# Patient Record
Sex: MALE | Race: WHITE | NOT HISPANIC OR LATINO | Employment: STUDENT | URBAN - METROPOLITAN AREA
[De-identification: names, ages, dates, MRNs, and addresses within clinical notes are randomized per-mention and may not be internally consistent; named-entity substitution may affect disease eponyms.]

---

## 2021-08-16 ENCOUNTER — LAB (OUTPATIENT)
Dept: LAB | Facility: HOSPITAL | Age: 21
End: 2021-08-16

## 2021-08-16 ENCOUNTER — OFFICE VISIT (OUTPATIENT)
Dept: INTERNAL MEDICINE | Facility: CLINIC | Age: 21
End: 2021-08-16

## 2021-08-16 VITALS
WEIGHT: 137 LBS | HEART RATE: 92 BPM | DIASTOLIC BLOOD PRESSURE: 78 MMHG | HEIGHT: 70 IN | BODY MASS INDEX: 19.61 KG/M2 | TEMPERATURE: 97.8 F | SYSTOLIC BLOOD PRESSURE: 102 MMHG

## 2021-08-16 DIAGNOSIS — Z76.89 ENCOUNTER TO ESTABLISH CARE: ICD-10-CM

## 2021-08-16 DIAGNOSIS — R11.2 NAUSEA AND VOMITING, INTRACTABILITY OF VOMITING NOT SPECIFIED, UNSPECIFIED VOMITING TYPE: Primary | ICD-10-CM

## 2021-08-16 DIAGNOSIS — F12.10 CANNABIS ABUSE, DAILY USE: ICD-10-CM

## 2021-08-16 DIAGNOSIS — R11.2 NAUSEA AND VOMITING, INTRACTABILITY OF VOMITING NOT SPECIFIED, UNSPECIFIED VOMITING TYPE: ICD-10-CM

## 2021-08-16 LAB
ALBUMIN SERPL-MCNC: 5.2 G/DL (ref 3.5–5.2)
ALBUMIN/GLOB SERPL: 1.5 G/DL
ALP SERPL-CCNC: 76 U/L (ref 39–117)
ALT SERPL W P-5'-P-CCNC: 37 U/L (ref 1–41)
AMYLASE SERPL-CCNC: 69 U/L (ref 28–100)
ANION GAP SERPL CALCULATED.3IONS-SCNC: 11.8 MMOL/L (ref 5–15)
AST SERPL-CCNC: 30 U/L (ref 1–40)
BASOPHILS # BLD AUTO: 0.03 10*3/MM3 (ref 0–0.2)
BASOPHILS NFR BLD AUTO: 0.2 % (ref 0–1.5)
BILIRUB SERPL-MCNC: 1.8 MG/DL (ref 0–1.2)
BUN SERPL-MCNC: 30 MG/DL (ref 6–20)
BUN/CREAT SERPL: 20.1 (ref 7–25)
CALCIUM SPEC-SCNC: 10.3 MG/DL (ref 8.6–10.5)
CHLORIDE SERPL-SCNC: 80 MMOL/L (ref 98–107)
CHOLEST SERPL-MCNC: 120 MG/DL (ref 0–200)
CO2 SERPL-SCNC: 37.2 MMOL/L (ref 22–29)
CREAT SERPL-MCNC: 1.49 MG/DL (ref 0.76–1.27)
DEPRECATED RDW RBC AUTO: 36.3 FL (ref 37–54)
EOSINOPHIL # BLD AUTO: 0.11 10*3/MM3 (ref 0–0.4)
EOSINOPHIL NFR BLD AUTO: 0.9 % (ref 0.3–6.2)
ERYTHROCYTE [DISTWIDTH] IN BLOOD BY AUTOMATED COUNT: 11.7 % (ref 12.3–15.4)
GFR SERPL CREATININE-BSD FRML MDRD: 59 ML/MIN/1.73
GLOBULIN UR ELPH-MCNC: 3.4 GM/DL
GLUCOSE SERPL-MCNC: 104 MG/DL (ref 65–99)
HCT VFR BLD AUTO: 49.8 % (ref 37.5–51)
HDLC SERPL-MCNC: 52 MG/DL (ref 40–60)
HGB BLD-MCNC: 17.6 G/DL (ref 13–17.7)
IMM GRANULOCYTES # BLD AUTO: 0.06 10*3/MM3 (ref 0–0.05)
IMM GRANULOCYTES NFR BLD AUTO: 0.5 % (ref 0–0.5)
LDLC SERPL CALC-MCNC: 55 MG/DL (ref 0–100)
LDLC/HDLC SERPL: 1.07 {RATIO}
LIPASE SERPL-CCNC: 37 U/L (ref 13–60)
LYMPHOCYTES # BLD AUTO: 2.45 10*3/MM3 (ref 0.7–3.1)
LYMPHOCYTES NFR BLD AUTO: 20.3 % (ref 19.6–45.3)
MCH RBC QN AUTO: 30.8 PG (ref 26.6–33)
MCHC RBC AUTO-ENTMCNC: 35.3 G/DL (ref 31.5–35.7)
MCV RBC AUTO: 87.2 FL (ref 79–97)
MONOCYTES # BLD AUTO: 1.43 10*3/MM3 (ref 0.1–0.9)
MONOCYTES NFR BLD AUTO: 11.9 % (ref 5–12)
NEUTROPHILS NFR BLD AUTO: 66.2 % (ref 42.7–76)
NEUTROPHILS NFR BLD AUTO: 7.98 10*3/MM3 (ref 1.7–7)
NRBC BLD AUTO-RTO: 0 /100 WBC (ref 0–0.2)
PLATELET # BLD AUTO: 477 10*3/MM3 (ref 140–450)
PMV BLD AUTO: 10.2 FL (ref 6–12)
POTASSIUM SERPL-SCNC: 3.4 MMOL/L (ref 3.5–5.2)
PROT SERPL-MCNC: 8.6 G/DL (ref 6–8.5)
RBC # BLD AUTO: 5.71 10*6/MM3 (ref 4.14–5.8)
SODIUM SERPL-SCNC: 129 MMOL/L (ref 136–145)
TRIGL SERPL-MCNC: 62 MG/DL (ref 0–150)
TSH SERPL DL<=0.05 MIU/L-ACNC: 0.99 UIU/ML (ref 0.27–4.2)
VLDLC SERPL-MCNC: 13 MG/DL (ref 5–40)
WBC # BLD AUTO: 12.06 10*3/MM3 (ref 3.4–10.8)

## 2021-08-16 PROCEDURE — 81001 URINALYSIS AUTO W/SCOPE: CPT

## 2021-08-16 PROCEDURE — 80053 COMPREHEN METABOLIC PANEL: CPT

## 2021-08-16 PROCEDURE — 99204 OFFICE O/P NEW MOD 45 MIN: CPT | Performed by: NURSE PRACTITIONER

## 2021-08-16 PROCEDURE — 36415 COLL VENOUS BLD VENIPUNCTURE: CPT

## 2021-08-16 PROCEDURE — 80061 LIPID PANEL: CPT

## 2021-08-16 PROCEDURE — 85025 COMPLETE CBC W/AUTO DIFF WBC: CPT

## 2021-08-16 PROCEDURE — 83690 ASSAY OF LIPASE: CPT

## 2021-08-16 PROCEDURE — 82150 ASSAY OF AMYLASE: CPT

## 2021-08-16 PROCEDURE — 84443 ASSAY THYROID STIM HORMONE: CPT

## 2021-08-16 RX ORDER — ONDANSETRON 4 MG/1
4 TABLET, FILM COATED ORAL EVERY 8 HOURS PRN
Qty: 30 TABLET | Refills: 0 | Status: SHIPPED | OUTPATIENT
Start: 2021-08-16

## 2021-08-16 RX ORDER — METOCLOPRAMIDE 10 MG/1
10 TABLET ORAL
COMMUNITY
End: 2021-08-16

## 2021-08-16 RX ORDER — OMEPRAZOLE 40 MG/1
40 CAPSULE, DELAYED RELEASE ORAL DAILY
Qty: 30 CAPSULE | Refills: 0 | Status: SHIPPED | OUTPATIENT
Start: 2021-08-16

## 2021-08-16 NOTE — PATIENT INSTRUCTIONS
Cannabis Use Disorder  Cannabis use disorder occurs when marijuana use disrupts a person's daily life or causes health problems. This condition can be dangerous. The health problems this condition can cause include:  · Long-lasting problems with thinking and learning. These can be permanent in young people.  · Mental health problems, such as severe anxiety, paranoia, hallucinations, or schizophrenia.  · Dangerously high blood pressure and heart rate.  · Breathing problems.  · Problems with child development during and after pregnancy.  People with this condition are also more likely to use other drugs.  What are the causes?  This condition is caused by using marijuana too much over time. It is not caused by using it only once in a while. Many people with this condition use marijuana because it gives them a feeling of extreme pleasure or relaxation.  What increases the risk?  This condition is more likely to develop in:  · Men.  · People with a family history of cannabis use disorder.  · People with mental health issues such as depression or post-traumatic stress disorder (PTSD).  What are the signs or symptoms?  Symptoms of this condition include:  Addiction  · Using greater amounts of marijuana than you want to, or using marijuana for longer than you want to.  · Craving marijuana.  · Spending a lot of time getting marijuana, using it, or recovering from its effects.  · Having problems at work, at school, at home, or in relationships because of marijuana use.  · Giving up or cutting down on important life activities because of marijuana use.  · Using marijuana at times when it is dangerous, such as while you are driving a car.  · Needing more and more marijuana to get the same desired effect (building up a tolerance).  · Lack of motivation, known as amotivational syndrome, which leads to poor school and work performance.  Physical problems  · A long-lasting cough.  · Bronchitis.  · Emphysema.  · Throat and lung  cancer.  Mental problems  · Psychosis.  · Anxiety.  · Trouble sleeping.  · Increase in violent behavior in young people.  Withdrawal problems  You may have symptoms when you stop using marijuana. Symptoms include:  · Irritability or anger.  · Anxiety or restlessness.  · Trouble sleeping.  · Loss of appetite or weight loss.  · Aches and pains.  · Shakiness, sweating, or chills.  How is this diagnosed?  This condition is diagnosed with an assessment. During the assessment, your health care provider will ask about your marijuana use and how it affects your life. You will be diagnosed with the condition if you have had at least two symptoms of this condition within a 12-month period. How severe the condition is depends on how many symptoms you have.  · If you have two to three symptoms, your condition is mild.  · If you have four to five symptoms, your condition is moderate.  · If you have six or more symptoms, your condition is severe.  Your health care provider may perform a physical exam or do lab tests to see if you have physical problems resulting from marijuana use. Your health care provider may also screen for drug use and refer you to a mental health professional for evaluation.  How is this treated?  Treatment for this condition is usually provided by mental health professionals with training in substance use disorders. Your treatment may involve:  · Counseling. This treatment is also called talk therapy. It is provided by substance use treatment counselors. A counselor can address the reasons you use marijuana and suggest ways to keep you from using it again. The goals of talk therapy are to:  ? Find healthy activities to replace using marijuana.  ? Identify and avoid the things that trigger your marijuana use.  ? Help you learn how to handle cravings.  · Support groups. Support groups are led by people who have quit using marijuana. They provide emotional support, advice, and guidance.  · Medicine. Medicine  is used to treat mental health issues that trigger marijuana use or that result from it.  Follow these instructions at home:  Lifestyle  Make healthy lifestyle choices, such as:  · Eating a healthy diet.  · Getting enough exercise.  · Improving your stress-management skills.    General instructions  · Take over-the-counter, prescription medicines, and herbal remedies only as told by your health care provider.  · Check with your health care provider before starting any new medicines.  · Work with your counselor or group to develop tools to keep you from using marijuana again (relapsing).  · Learn daily living skills and work skills.  · Keep all follow-up visits as told by your health care provider. This is important.  Where to find more information  · National [place] on Drug Abuse: www.drugabuse.gov  · Centers for Disease Control and Prevention: www.cdc.gov  · Substance Abuse and Mental Health Services Administration: www.samhsa.gov  Contact a health care provider if:  · You are not able to take your medicines as told.  · Your symptoms get worse.  Get help right away if:  · You have serious thoughts about hurting yourself or others.  If you ever feel like you may hurt yourself or others, or have thoughts about taking your own life, get help right away. You can go to your nearest emergency department or call:  · Your local emergency services (911 in the [place]  · A suicide crisis helpline, such as the National Suicide Prevention Lifeline at 1-478.661.7620. This is open 24 hours a day in the [place]  Summary  · Cannabis use disorder is when using marijuana disrupts a person's daily life or causes health problems.  · This condition is caused by using marijuana too much over time.  · Treatment for this condition is usually provided by mental health professionals with training in substance use disorders.  · Treatment may involve counseling, support groups, or medicine.  This information is not intended to replace advice  given to you by your health care provider. Make sure you discuss any questions you have with your health care provider.  Document Revised: 11/17/2020 Document Reviewed: 11/17/2020  [employer] Patient Education © 2021 [employer]

## 2021-08-16 NOTE — PROGRESS NOTES
Roly Pearl  2000  [id number]  Patient Care Team:  Marie Spangler APRN as PCP - General (Internal Medicine)    Roly Pearl is a 21 y.o. here today to establish care.    Chief Complaint   Patient presents with   • GI Problem     Patient is here to establish care       HPI:   Roly suspects he has developed cannaboid hyperemesis syndrome.  1 week history of abd pain n/v/diarrhea and shaking.  Initially, he reports vomiting 50 less times a day.  For the past 2 to 3 days is vomiting 2-3 times a day.  No blood in stool. Was seen in ED at [facility]. Initially had periumbilical pain.  Only had IVF but no meds, labs or imaging.  Given rx for relgan from ED and only took 1-2 doses. No diarreha past five days. Smoking thc x 2 yrs, increased to daily over past 2 mo.  Prior smoked 3-4 x weekly.  No THC this week.  Normally has a good appetite but has lost 15 pounds during this episode.  Typically weight 150-160.  He is rehydrating slowly and trying to eat small amounts of bland foods.  Ate a few cheese its last night and a banana today but had vomiting after both.    2 other episodes in the past year but neither were this severe.  Hot bath/shower helped in the past.    No IBD in family    He plans to go to college in [place] and leave later this week.    History reviewed. No pertinent past medical history.  History reviewed. No pertinent surgical history.  Family History   Problem Relation Age of Onset   • Arthritis Mother      Social History     Tobacco Use   Smoking Status Never Smoker   Smokeless Tobacco Never Used     No Known Allergies    Current Outpatient Medications:   •  omeprazole (priLOSEC) 40 MG capsule, Take 1 capsule by mouth Daily., Disp: 30 capsule, Rfl: 0  •  ondansetron (Zofran) 4 MG tablet, Take 1 tablet by mouth Every 8 (Eight) Hours As Needed for Nausea or Vomiting., Disp: 30 tablet, Rfl: 0    Review of Systems    /78 (BP Location: Left arm, Patient Position:  "Sitting, Cuff Size: Adult)   Pulse 92   Temp 97.8 °F (36.6 °C) (Temporal)   Ht 179 cm (70.47\")   Wt 62.1 kg (137 lb)   BMI 19.40 kg/m²     Physical Exam  Vitals reviewed.   Constitutional:       General: He is not in acute distress.     Appearance: Normal appearance. He is well-developed. He is not ill-appearing or toxic-appearing.   HENT:      Head: Normocephalic.      Comments: *wearing mask     Right Ear: External ear normal.      Left Ear: External ear normal.   Eyes:      Conjunctiva/sclera: Conjunctivae normal.      Pupils: Pupils are equal, round, and reactive to light.   Cardiovascular:      Rate and Rhythm: Normal rate and regular rhythm.      Pulses: Normal pulses.      Heart sounds: Normal heart sounds.   Pulmonary:      Effort: Pulmonary effort is normal.      Breath sounds: Normal breath sounds.   Abdominal:      General: Abdomen is flat. Bowel sounds are normal.      Palpations: Abdomen is soft.      Tenderness: There is no abdominal tenderness.   Musculoskeletal:         General: Normal range of motion.      Cervical back: Normal range of motion and neck supple.   Skin:     General: Skin is warm and dry.   Neurological:      Mental Status: He is alert and oriented to person, place, and time.   Psychiatric:         Mood and Affect: Mood normal.         Behavior: Behavior normal.         Thought Content: Thought content normal.         Judgment: Judgment normal.         Results Review:  None    Assessment/Plan:  Patient Instructions   Problem List Items Addressed This Visit     None      Visit Diagnoses     Nausea and vomiting, intractability of vomiting not specified, unspecified vomiting type    -  Primary    Add omeprazole 40 mg daily first thing every morning, use ondansetron as needed, labs and urine today.  will need 1 mo f/u here or [facility] in [place]    Relevant Orders    CBC & Differential    Comprehensive Metabolic Panel    Lipid Panel    TSH Rfx On Abnormal To Free T4    Urinalysis With " Culture If Indicated - Urine, Clean Catch    Lipase    Amylase    Cannabis abuse, daily use        advise continued cessation from cannabis    Encounter to establish care                 Diagnosis Plan   1. Nausea and vomiting, intractability of vomiting not specified, unspecified vomiting type  CBC & Differential    Comprehensive Metabolic Panel    Lipid Panel    TSH Rfx On Abnormal To Free T4    Urinalysis With Culture If Indicated - Urine, Clean Catch    Lipase    Amylase    Add omeprazole 40 mg daily first thing every morning, use ondansetron as needed, labs and urine today.  will need 1 mo f/u here or [facility] in [place]   2. Cannabis abuse, daily use      advise continued cessation from cannabis   3. Encounter to establish care         Patient Instructions   Cannabis Use Disorder  Cannabis use disorder occurs when marijuana use disrupts a person's daily life or causes health problems. This condition can be dangerous. The health problems this condition can cause include:  · Long-lasting problems with thinking and learning. These can be permanent in young people.  · Mental health problems, such as severe anxiety, paranoia, hallucinations, or schizophrenia.  · Dangerously high blood pressure and heart rate.  · Breathing problems.  · Problems with child development during and after pregnancy.  People with this condition are also more likely to use other drugs.  What are the causes?  This condition is caused by using marijuana too much over time. It is not caused by using it only once in a while. Many people with this condition use marijuana because it gives them a feeling of extreme pleasure or relaxation.  What increases the risk?  This condition is more likely to develop in:  · Men.  · People with a family history of cannabis use disorder.  · People with mental health issues such as depression or post-traumatic stress disorder (PTSD).  What are the signs or symptoms?  Symptoms of this condition  include:  Addiction  · Using greater amounts of marijuana than you want to, or using marijuana for longer than you want to.  · Craving marijuana.  · Spending a lot of time getting marijuana, using it, or recovering from its effects.  · Having problems at work, at school, at home, or in relationships because of marijuana use.  · Giving up or cutting down on important life activities because of marijuana use.  · Using marijuana at times when it is dangerous, such as while you are driving a car.  · Needing more and more marijuana to get the same desired effect (building up a tolerance).  · Lack of motivation, known as amotivational syndrome, which leads to poor school and work performance.  Physical problems  · A long-lasting cough.  · Bronchitis.  · Emphysema.  · Throat and lung cancer.  Mental problems  · Psychosis.  · Anxiety.  · Trouble sleeping.  · Increase in violent behavior in young people.  Withdrawal problems  You may have symptoms when you stop using marijuana. Symptoms include:  · Irritability or anger.  · Anxiety or restlessness.  · Trouble sleeping.  · Loss of appetite or weight loss.  · Aches and pains.  · Shakiness, sweating, or chills.  How is this diagnosed?  This condition is diagnosed with an assessment. During the assessment, your health care provider will ask about your marijuana use and how it affects your life. You will be diagnosed with the condition if you have had at least two symptoms of this condition within a 12-month period. How severe the condition is depends on how many symptoms you have.  · If you have two to three symptoms, your condition is mild.  · If you have four to five symptoms, your condition is moderate.  · If you have six or more symptoms, your condition is severe.  Your health care provider may perform a physical exam or do lab tests to see if you have physical problems resulting from marijuana use. Your health care provider may also screen for drug use and refer you to a  mental health professional for evaluation.  How is this treated?  Treatment for this condition is usually provided by mental health professionals with training in substance use disorders. Your treatment may involve:  · Counseling. This treatment is also called talk therapy. It is provided by substance use treatment counselors. A counselor can address the reasons you use marijuana and suggest ways to keep you from using it again. The goals of talk therapy are to:  ? Find healthy activities to replace using marijuana.  ? Identify and avoid the things that trigger your marijuana use.  ? Help you learn how to handle cravings.  · Support groups. Support groups are led by people who have quit using marijuana. They provide emotional support, advice, and guidance.  · Medicine. Medicine is used to treat mental health issues that trigger marijuana use or that result from it.  Follow these instructions at home:  Lifestyle  Make healthy lifestyle choices, such as:  · Eating a healthy diet.  · Getting enough exercise.  · Improving your stress-management skills.    General instructions  · Take over-the-counter, prescription medicines, and herbal remedies only as told by your health care provider.  · Check with your health care provider before starting any new medicines.  · Work with your counselor or group to develop tools to keep you from using marijuana again (relapsing).  · Learn daily living skills and work skills.  · Keep all follow-up visits as told by your health care provider. This is important.  Where to find more information  · National [place] on Drug Abuse: www.drugabuse.gov  · Centers for Disease Control and Prevention: www.cdc.gov  · Substance Abuse and Mental Health Services Administration: www.samhsa.gov  Contact a health care provider if:  · You are not able to take your medicines as told.  · Your symptoms get worse.  Get help right away if:  · You have serious thoughts about hurting yourself or others.  If you  ever feel like you may hurt yourself or others, or have thoughts about taking your own life, get help right away. You can go to your nearest emergency department or call:  · Your local emergency services (911 in the [place]  · A suicide crisis helpline, such as the National Suicide Prevention Lifeline at 1-405.457.6350. This is open 24 hours a day in the [place]  Summary  · Cannabis use disorder is when using marijuana disrupts a person's daily life or causes health problems.  · This condition is caused by using marijuana too much over time.  · Treatment for this condition is usually provided by mental health professionals with training in substance use disorders.  · Treatment may involve counseling, support groups, or medicine.  This information is not intended to replace advice given to you by your health care provider. Make sure you discuss any questions you have with your health care provider.  Document Revised: 11/17/2020 Document Reviewed: 11/17/2020  [employer] Patient Education © 2021 [employer]      Plan of care reviewed with patient at the conclusion of today's visit. Education was provided regarding diagnosis and management.  Patient verbalizes understanding of and agreement with management plan.    Return in about 1 month (around 9/16/2021).    * Please note that portions of this note were completed with a voice recognition program. Efforts were made to edit the dictation but occasionally words are transcribed.     YADIRA Singer

## 2021-08-17 LAB
BACTERIA UR QL AUTO: ABNORMAL /HPF
BILIRUB UR QL STRIP: NEGATIVE
CLARITY UR: CLEAR
COLOR UR: ABNORMAL
GLUCOSE UR STRIP-MCNC: NEGATIVE MG/DL
HGB UR QL STRIP.AUTO: NEGATIVE
HYALINE CASTS UR QL AUTO: ABNORMAL /LPF
KETONES UR QL STRIP: ABNORMAL
LEUKOCYTE ESTERASE UR QL STRIP.AUTO: NEGATIVE
NITRITE UR QL STRIP: NEGATIVE
PH UR STRIP.AUTO: 7 [PH] (ref 5–8)
PROT UR QL STRIP: ABNORMAL
RBC # UR: ABNORMAL /HPF
REF LAB TEST METHOD: ABNORMAL
RENAL EPI CELLS #/AREA URNS HPF: ABNORMAL /HPF
SP GR UR STRIP: 1.03 (ref 1–1.03)
SQUAMOUS #/AREA URNS HPF: ABNORMAL /HPF
UROBILINOGEN UR QL STRIP: ABNORMAL
WBC UR QL AUTO: ABNORMAL /HPF

## 2021-08-18 ENCOUNTER — TELEPHONE (OUTPATIENT)
Dept: INTERNAL MEDICINE | Facility: CLINIC | Age: 21
End: 2021-08-18

## 2021-08-18 NOTE — TELEPHONE ENCOUNTER
Caller: Roly Pearl    Relationship: Self    Best call back number: [phone]     What medications are you currently taking:   Current Outpatient Medications on File Prior to Visit   Medication Sig Dispense Refill   • omeprazole (priLOSEC) 40 MG capsule Take 1 capsule by mouth Daily. 30 capsule 0   • ondansetron (Zofran) 4 MG tablet Take 1 tablet by mouth Every 8 (Eight) Hours As Needed for Nausea or Vomiting. 30 tablet 0     No current facility-administered medications on file prior to visit.          When did you start taking these medications:     Which medication are you concerned about: omeprazole (priLOSEC) 40 MG capsule    Who prescribed you this medication: TRUDI BEJARANO.    What are your concerns: PATIENT STATES HE WAS SEEN AT THE DENTIST THE OTHER DAY AND HIS DENTIST WOULD LIKE TO PLACE HIM ON METHYLPREDNISOLONE AND AMOXICILLIN FOR A TOOTH INFECTION. PATIENT WOULD LIKE TO MAKE SURE HE CAN SAFELY TAKE THE ANTIBIOTIC AND STEROID FROM THE DENTIST WITH THE omeprazole (priLOSEC) 40 MG capsule.      How long have you had these concerns: